# Patient Record
Sex: FEMALE | Race: WHITE
[De-identification: names, ages, dates, MRNs, and addresses within clinical notes are randomized per-mention and may not be internally consistent; named-entity substitution may affect disease eponyms.]

---

## 2018-02-24 ENCOUNTER — HOSPITAL ENCOUNTER (EMERGENCY)
Dept: HOSPITAL 96 - M.ERS | Age: 48
Discharge: HOME | End: 2018-02-24
Payer: COMMERCIAL

## 2018-02-24 VITALS — WEIGHT: 263.01 LBS | HEIGHT: 66 IN | BODY MASS INDEX: 42.27 KG/M2

## 2018-02-24 VITALS — DIASTOLIC BLOOD PRESSURE: 73 MMHG | SYSTOLIC BLOOD PRESSURE: 139 MMHG

## 2018-02-24 DIAGNOSIS — E11.9: ICD-10-CM

## 2018-02-24 DIAGNOSIS — J10.1: Primary | ICD-10-CM

## 2018-02-24 DIAGNOSIS — F31.9: ICD-10-CM

## 2018-02-24 LAB
ABSOLUTE BASOPHILS: 0 THOU/UL (ref 0–0.2)
ABSOLUTE EOSINOPHILS: 0 THOU/UL (ref 0–0.7)
ABSOLUTE MONOCYTES: 0.3 THOU/UL (ref 0–1.2)
ALBUMIN SERPL-MCNC: 3.2 G/DL (ref 3.4–5)
ALP SERPL-CCNC: 112 U/L (ref 46–116)
ALT SERPL-CCNC: 33 U/L (ref 30–65)
ANION GAP SERPL CALC-SCNC: 9 MMOL/L (ref 7–16)
APTT BLD: 26.6 SECONDS (ref 25–31.3)
AST SERPL-CCNC: 23 U/L (ref 15–37)
BASOPHILS NFR BLD AUTO: 0.5 %
BILIRUB SERPL-MCNC: 0.4 MG/DL
BUN SERPL-MCNC: 10 MG/DL (ref 7–18)
CALCIUM SERPL-MCNC: 8.3 MG/DL (ref 8.5–10.1)
CHLORIDE SERPL-SCNC: 103 MMOL/L (ref 98–107)
CK-MB MASS: < 0.5 NG/ML
CO2 SERPL-SCNC: 25 MMOL/L (ref 21–32)
CREAT SERPL-MCNC: 1 MG/DL (ref 0.6–1.3)
EOSINOPHIL NFR BLD: 0.2 %
GLUCOSE SERPL-MCNC: 330 MG/DL (ref 70–99)
GRANULOCYTES NFR BLD MANUAL: 57.6 %
HCT VFR BLD CALC: 37.4 % (ref 37–47)
HGB BLD-MCNC: 12.4 GM/DL (ref 12–15)
INR PPP: 1
LIPASE: 156 U/L (ref 73–393)
LYMPHOCYTES # BLD: 1.3 THOU/UL (ref 0.8–5.3)
LYMPHOCYTES NFR BLD AUTO: 34.4 %
MAGNESIUM SERPL-MCNC: 1.8 MG/DL (ref 1.8–2.4)
MCH RBC QN AUTO: 27.2 PG (ref 26–34)
MCHC RBC AUTO-ENTMCNC: 33.2 G/DL (ref 28–37)
MCV RBC: 81.8 FL (ref 80–100)
MONOCYTES NFR BLD: 7.3 %
MPV: 8.9 FL. (ref 7.2–11.1)
NEUTROPHILS # BLD: 2.2 THOU/UL (ref 1.6–8.1)
NT-PRO BRAIN NAT PEPTIDE: 125 PG/ML (ref ?–300)
NUCLEATED RBCS: 0 /100WBC
PLATELET COUNT*: 193 THOU/UL (ref 150–400)
POTASSIUM SERPL-SCNC: 3.7 MMOL/L (ref 3.5–5.1)
PROT SERPL-MCNC: 7.5 G/DL (ref 6.4–8.2)
PROTHROMBIN TIME: 9.5 SECONDS (ref 9.2–11.5)
RBC # BLD AUTO: 4.57 MIL/UL (ref 4.2–5)
RDW-CV: 15.4 % (ref 10.5–14.5)
SODIUM SERPL-SCNC: 137 MMOL/L (ref 136–145)
TROPONIN-I LEVEL: <0.06 NG/ML (ref ?–0.06)
WBC # BLD AUTO: 3.8 THOU/UL (ref 4–11)

## 2018-02-25 LAB
EST. AVERAGE GLUCOSE BLD GHB EST-MCNC: 197 MG/DL
GLYCOHEMOGLOBIN (HGB A1C): 8.5 % (ref 4.8–5.6)

## 2018-02-25 NOTE — EKG
[place] [address]
Phone:  [phone]                     ELECTROCARDIOGRAM REPORT      
_______________________________________________________________________________
 
Name:       MARYPEDRITOMIKHAIL ACEVEDO               Room:                      [facility]#:  [id number]      Account #:      [id number]  
Admission:  18     Attend Phys:                         
Discharge:  18     Date of Birth:  [birth year]  
         Report #: [id number]
    [id number]
_______________________________________________________________________________
THIS REPORT FOR:  //name//                      
 
                         [facility] ED
                                       
Test Date:    2018               Test Time:    18:36:51
Pat Name:     MIKHAIL PONCE           Department:   
Patient ID:   [id number]            Room:          
Gender:       F                        Technician:   MS
:          1970               Requested By: Lody Vasquez
Order Number: [id number] MD:   Franky Connelly
                                 Measurements
Intervals                              Axis          
Rate:         91                       P:            29
CA:           149                      QRS:          22
QRSD:         101                      T:            18
QT:           344                                    
QTc:          424                                    
                           Interpretive Statements
Sinus rhythm
No previous ECG available for comparison
 
Electronically Signed On 2018 12:33:35 CST by Franky Connelly
[email or web address]
 
 
 
 
 
 
 
 
 
 
 
 
 
 
 
 
 
 
 
 
  <ELECTRONICALLY SIGNED>
                                           By: Franky Connelly MD, [facility]   
  18     1233
D: 18 1836   _____________________________________
T: 18 1836   Franky Connelly MD, FACC     /EPI

## 2018-08-30 ENCOUNTER — HOSPITAL ENCOUNTER (OUTPATIENT)
Dept: HOSPITAL 96 - M.RAD | Age: 48
End: 2018-08-30
Attending: NURSE PRACTITIONER
Payer: COMMERCIAL

## 2018-08-30 DIAGNOSIS — M25.612: Primary | ICD-10-CM

## 2018-08-30 DIAGNOSIS — M25.512: ICD-10-CM
